# Patient Record
Sex: FEMALE | Race: BLACK OR AFRICAN AMERICAN | Employment: UNEMPLOYED | URBAN - METROPOLITAN AREA
[De-identification: names, ages, dates, MRNs, and addresses within clinical notes are randomized per-mention and may not be internally consistent; named-entity substitution may affect disease eponyms.]

---

## 2017-07-16 ENCOUNTER — HOSPITAL ENCOUNTER (EMERGENCY)
Facility: HOSPITAL | Age: 42
Discharge: HOME | End: 2017-07-17
Attending: EMERGENCY MEDICINE
Payer: MEDICAID

## 2017-07-16 DIAGNOSIS — S01.01XA SCALP LACERATION, INITIAL ENCOUNTER: Primary | ICD-10-CM

## 2017-07-16 PROCEDURE — 12002 RPR S/N/AX/GEN/TRNK2.6-7.5CM: CPT

## 2017-07-16 PROCEDURE — 99283 EMERGENCY DEPT VISIT LOW MDM: CPT

## 2017-07-17 VITALS
RESPIRATION RATE: 19 BRPM | BODY MASS INDEX: 27.48 KG/M2 | OXYGEN SATURATION: 98 % | HEIGHT: 60 IN | SYSTOLIC BLOOD PRESSURE: 102 MMHG | DIASTOLIC BLOOD PRESSURE: 76 MMHG | TEMPERATURE: 98 F | HEART RATE: 97 BPM | WEIGHT: 140 LBS
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
## 2024-12-05 ENCOUNTER — HOSPITAL ENCOUNTER (OUTPATIENT)
Age: 49
Discharge: HOME | End: 2024-12-05
Payer: MEDICAID

## 2024-12-05 ENCOUNTER — APPOINTMENT (OUTPATIENT)
Dept: GENERAL RADIOLOGY | Age: 49
End: 2024-12-05
Attending: NURSE PRACTITIONER
Payer: MEDICAID

## 2024-12-05 VITALS
TEMPERATURE: 98 F | RESPIRATION RATE: 20 BRPM | HEART RATE: 84 BPM | HEIGHT: 63 IN | SYSTOLIC BLOOD PRESSURE: 128 MMHG | OXYGEN SATURATION: 98 % | DIASTOLIC BLOOD PRESSURE: 84 MMHG | WEIGHT: 210 LBS | BODY MASS INDEX: 37.21 KG/M2

## 2024-12-05 DIAGNOSIS — S93.401A SPRAIN OF RIGHT ANKLE, UNSPECIFIED LIGAMENT, INITIAL ENCOUNTER: Primary | ICD-10-CM

## 2024-12-05 PROCEDURE — 99204 OFFICE O/P NEW MOD 45 MIN: CPT

## 2024-12-05 PROCEDURE — 73630 X-RAY EXAM OF FOOT: CPT | Performed by: NURSE PRACTITIONER

## 2024-12-05 PROCEDURE — 99203 OFFICE O/P NEW LOW 30 MIN: CPT

## 2024-12-05 PROCEDURE — 73610 X-RAY EXAM OF ANKLE: CPT | Performed by: NURSE PRACTITIONER

## 2024-12-05 RX ORDER — HYDROCODONE BITARTRATE AND ACETAMINOPHEN 5; 325 MG/1; MG/1
1 TABLET ORAL ONCE
Status: COMPLETED | OUTPATIENT
Start: 2024-12-05 | End: 2024-12-05

## 2024-12-05 RX ORDER — HYDROCODONE BITARTRATE AND ACETAMINOPHEN 5; 325 MG/1; MG/1
1-2 TABLET ORAL EVERY 6 HOURS PRN
Qty: 10 TABLET | Refills: 0 | Status: SHIPPED | OUTPATIENT
Start: 2024-12-05 | End: 2024-12-12

## 2024-12-06 NOTE — ED PROVIDER NOTES
Patient Seen in: [facility]      History     Chief Complaint   Patient presents with    Ankle Injury     Stated Complaint: Ankle Injury    Subjective:   49-year-old female presents for right ankle pain and swelling.  Patient states she was walking over to her neighbor's house when there is a divot in the ground next to the cement walk that was covered with leaves.  She states her right foot went into the hole twisting her ankle.  She has pain to the lateral aspect of her foot and swelling to her ankle.    Objective:     History reviewed. No pertinent past medical history.           Past Surgical History:   Procedure Laterality Date    Abdominoplasty      tummy tuck    Total abdom hysterectomy                  Social History     Socioeconomic History    Marital status: Single   Tobacco Use    Smoking status: Former     Types: Cigarettes     Start date: 2018   Vaping Use    Vaping status: Never Used              Review of Systems   Constitutional: Negative.    Respiratory: Negative.     Cardiovascular: Negative.    Gastrointestinal: Negative.    Skin: Negative.    Neurological: Negative.        Positive for stated complaint: Ankle Injury  Other systems are as noted in HPI.  Constitutional and vital signs reviewed.      All other systems reviewed and negative except as noted above.    Physical Exam     ED Triage Vitals [12/05/24 1841]   /84   Pulse 84   Resp 20   Temp 98 °F (36.7 °C)   Temp src    SpO2 98 %   O2 Device None (Room air)       Current Vitals:   Vital Signs  BP: 128/84  Pulse: 84  Resp: 20  Temp: 98 °F (36.7 °C)    Oxygen Therapy  SpO2: 98 %  O2 Device: None (Room air)        Physical Exam  Vitals and nursing note reviewed.   Constitutional:       General: She is not in acute distress.  HENT:      Head: Normocephalic.   Cardiovascular:      Rate and Rhythm: Normal rate.   Pulmonary:      Effort: Pulmonary effort is normal.   Musculoskeletal:         General: Normal range of motion.       Right ankle: Tenderness present over the lateral malleolus and base of 5th metatarsal.   Skin:     General: Skin is warm and dry.   Neurological:      General: No focal deficit present.      Mental Status: She is alert and oriented to person, place, and time.             ED Course   Labs Reviewed - No data to display  XR ANKLE (MIN 3 VIEWS), RIGHT (CPT=73610)    Result Date: 12/5/2024  PROCEDURE:  XR ANKLE (MIN 3 VIEWS), RIGHT (CPT=73610)  TECHNIQUE:  Three views were obtained.  COMPARISON:  None.  INDICATIONS:  Ankle Injury  PATIENT STATED HISTORY: (As transcribed by Technologist)  twisted ankle today              CONCLUSION:   Moderate soft tissue swelling about the lateral aspect of the ankle.  No associated fracture or malalignment.  The ankle mortise and joint spaces of the hindfoot are preserved.   LOCATION:  [facility]   Dictated by (CST): Shanti Cummings MD on 12/05/2024 at 7:59 PM     Finalized by (CST): Shanti Cummings MD on 12/05/2024 at 8:00 PM       XR FOOT, COMPLETE (MIN 3 VIEWS), RIGHT (CPT=73630)    Result Date: 12/5/2024  PROCEDURE:  XR FOOT, COMPLETE (MIN 3 VIEWS), RIGHT (CPT=73630)  TECHNIQUE:  AP, oblique, and lateral views were obtained.  COMPARISON:  None.  INDICATIONS:  Ankle Injury  PATIENT STATED HISTORY: (As transcribed by Technologist)  twisted ankle today               CONCLUSION:   Negative for fracture or malalignment.  Normal mineralization.  Joint spaces are preserved.  No periarticular erosions.  Mild soft tissue swelling of the dorsal midfoot.    LOCATION:  [facility]   Dictated by (CST): Shanti Cummings MD on 12/05/2024 at 7:58 PM     Finalized by (CST): Shanti Cummings MD on 12/05/2024 at 7:59 PM         [facility]      Medical Decision Making  Pertinent Labs & Imaging studies reviewed. (See chart for details).  Patient coming in with right foot and ankle pain after injury.   Differential diagnosis includes sprain, fracture  Will discharge on stirrup, crutches.   Patient is comfortable with this plan.      Overall Pt looks good. Non-toxic, well-hydrated and in no respiratory distress. Vital signs are reassuring. Exam is reassuring. I do not believe pt requires and additional diagnostic studies or intervention. I believe pt can be discharged home to continue evaluation as an outpatient. Follow-up provider given. Discharge instructions given and reviewed. Return for any problems. All understand and agrees with the plan.        Problems Addressed:  Sprain of right ankle, unspecified ligament, initial encounter: acute illness or injury    Amount and/or Complexity of Data Reviewed  Radiology: ordered and independent interpretation performed. Decision-making details documented in ED Course.     Details: I reviewed the images and my independent interpreation after review is no acute fracture noted. Additionaly, I reviewd the radiology report as noted in ed course        Disposition and Plan     Clinical Impression:  1. Sprain of right ankle, unspecified ligament, initial encounter         Disposition:  Discharge  12/5/2024  8:13 pm    Follow-up:  [facility], [address] [place] [id number] [phone]              Medications Prescribed:  Discharge Medication List as of 12/5/2024  8:05 PM        START taking these medications    Details   HYDROcodone-acetaminophen 5-325 MG Oral Tab Take 1-2 tablets by mouth every 6 (six) hours as needed for Pain., Normal, Disp-10 tablet, R-0                 Supplementary Documentation:

## 2024-12-06 NOTE — DISCHARGE INSTRUCTIONS
Keep ace wrap or air splint on for 3 days, and you can use crutches for 3 days, with weight bearing as tolerated     You can take over the counter ibuprofen and/or Tylenol for pain  Rest and elevate the affected extremity.  Ice every 4 hours for 20 minutes as needed for swelling    No gym, sports, or strenuous activity until cleared by your physician, or the referred orthopedic specialist    Follow-up with your primary care physician, or the orthopedic specialist 7 to 10 days if no better

## 2025-07-23 NOTE — ED PROVIDER NOTES
Patient Seen in: [facility] Emergency Department        History  Chief Complaint   Patient presents with    Hypertension     Stated Complaint: high BP, headache and nausea for days.  prescribed BP meds yesterday, took 1 pi*    Subjective:   HPI            50-year-old female presents reporting several weeks of headache and also newly being treated for hypertension.  She reports she had been having headaches for a while which prompted her to check her blood pressure which she found was elevated.  She saw her primary care doctor and was started on lisinopril 10 mg.  She took her first dose yesterday.  She did not take it yet today because her blood pressure was high when she woke up this morning.  Her blood pressure here is 151/99.  Her headache is stable.  She reports a CT of her head recently for these headaches and that it was normal.  She denies any chest pain, palpitations, shortness of breath.  States she is very anxious about the high blood pressure.  Her daughter is a nurse and told her she needed to go to the emergency room to get checked out      Objective:     Past Medical History:    Essential hypertension              Past Surgical History:   Procedure Laterality Date    Abdominoplasty      tummy tuck    Total abdom hysterectomy                  Social History     Socioeconomic History    Marital status: Single   Tobacco Use    Smoking status: Former     Types: Cigarettes     Start date: 2018   Vaping Use    Vaping status: Never Used   Substance and Sexual Activity    Alcohol use: Never    Drug use: Never     Social Drivers of Health     Food Insecurity: Low Risk  (7/27/2024)    [facility]     Within the past 12 months, the food you bought just didn't last and you didn't have money to get more.: 3     Within the past 12 months, you worried that your food would run out before you got money to buy more.: 3   Transportation Needs: Not At Risk (7/27/2024)    Received  from [facility] Transportation Needs     In the past 12 months, has lack of reliable transportation kept you from medical appointments, meetings, work or from getting things needed for daily living?: No   Housing Stability: Not At Risk (7/27/2024)    Received from [facility] Housing     What is your living situation today?: I have a steady place to live     Think about the place you live. Do you have problems with any of the following?: None of the above                                Physical Exam    ED Triage Vitals [07/23/25 1029]   BP (!) 146/105   Pulse 80   Resp 19   Temp 97.9 °F (36.6 °C)   Temp src Temporal   SpO2 99 %   O2 Device None (Room air)       Current Vitals:   Vital Signs  BP: (!) 151/99  Pulse: 77  Resp: 18  Temp: 97.9 °F (36.6 °C)  Temp src: Temporal  MAP (mmHg): (!) 114    Oxygen Therapy  SpO2: 100 %  O2 Device: None (Room air)            Physical Exam  General:  Vitals as listed.  No acute distress   HEENT: Sclerae anicteric.  Conjunctivae show no pallor.  Oropharynx clear, mucous membranes moist   Neck: supple, no rigidity   Lungs: good air exchange and clear   Heart: regular rate rhythm and no murmur   Abdomen: Soft and nontender.  No abdominal masses.  No peritoneal signs   Extremities: no edema, normal peripheral pulses   Neuro: Alert oriented and nonfocal   Skin: no rashes or nodules        ED Course  Labs Reviewed   CBC WITH DIFFERENTIAL WITH PLATELET - Abnormal; Notable for the following components:       Result Value    MCV 75.7 (*)     MCH 24.5 (*)     All other components within normal limits   COMP METABOLIC PANEL (14) - Normal   RAINBOW DRAW GOLD   RAINBOW DRAW BLUE     EKG    Rate, intervals and axes as noted on EKG Report.  Rate: 71  Rhythm: Sinus Rhythm  Reading: No acute ischemia                                  MDM     50-year-old female presents reporting uncontrolled blood pressure and headaches.  Headaches have been ongoing for weeks.  She reports a normal  CT scan recently.  Started blood pressure medication 1 day ago and has only taken 1 dose so far.  Did not take her medicine today    Differential includes but is not limited to new diagnosis of hypertension, recurrent headache, a life/function threat.    CBC, CMP, EKG ordered for further evaluation.    Lab work appears unremarkable with no evidence of endorgan damage.  Blood pressure 151/99.  Has lisinopril 10 mg with her.  Did not take it today.  We discussed at length the importance of taking medication as prescribed and that it will likely take a couple of months to get good control of her blood pressure but that this is the normal progression of things and is very safe.  She was very comforted by this discussion.  I did provide her neurology clinic information for evaluation of these recurrent headaches.  Should continue monitoring symptoms and return with any concerns.            Medical Decision Making      Disposition and Plan     Clinical Impression:  1. Hypertension, unspecified type    2. Frequent headaches         Disposition:  Discharge  7/23/2025 12:11 pm    Follow-up:  Darius Norwood DO  [address] [place] [address]  [phone]    Follow up  Neurology clinic here at [facility] for further evaluation of your headaches.    Follow-up with your primary care doctor as scheduled to further manage your elevated blood pressure.    Follow up  As we discussed this may take a couple of months to get good control of your blood pressure.  This is safe and as long as you are taking your medication as prescribed you will ultimately get good control of your blood pressure.          Medications Prescribed:  There are no discharge medications for this patient.            Supplementary Documentation:

## 2025-07-23 NOTE — ED INITIAL ASSESSMENT (HPI)
Pt presents to the ED with c/o elevated BP accompanied by HA and lightheadedness. Pt appears anxious and reports that she was seen at [facility] yesterday, where she was treated and discharged home with instructions to f/u the next morning. States she woke up today still feeling unwell, with continued symptoms and persistently elevated BP, prompting her decision to come to the ER for further evaluation and treatment. Denies CP, denies SOB

## (undated) NOTE — ED AVS SNAPSHOT
[facility] Emergency Department  [place] [address]  Phone:  [phone]  Fax:  [phone]          Gaby Hernandez   MRN: [id number]    Department:  [facility] Emergency Department   Date of Visit:  7/16/2017 visiting [email or web address].    IF THERE IS ANY CHANGE OR WORSENING OF YOUR CONDITION, CALL YOUR PRIMARY CARE PHYSICIAN AT ONCE OR RETURN IMMEDIATELY TO THE EMERGENCY DEPARTMENT.     If you have been prescribed any medication(s), please fill your prescription

## (undated) NOTE — LETTER
Date & Time: 12/5/2024, 7:55 PM  Patient: Lars Polanco  Encounter Provider(s):    Mary Price APRN       To Whom It May Concern:    Lars Polanco was seen and treated in our department on 12/5/2024. She should not return to work until 12/09/2024 .    If you have any questions or concerns, please do not hesitate to call.        Mary Price NP-C  Nurse Practitioner    This note has been electronically signed